# Patient Record
Sex: MALE | Race: WHITE | NOT HISPANIC OR LATINO | URBAN - METROPOLITAN AREA
[De-identification: names, ages, dates, MRNs, and addresses within clinical notes are randomized per-mention and may not be internally consistent; named-entity substitution may affect disease eponyms.]

---

## 2024-05-01 ENCOUNTER — TELEPHONE (OUTPATIENT)
Dept: NEUROSURGERY | Facility: CLINIC | Age: 34
End: 2024-05-01

## 2024-05-01 NOTE — TELEPHONE ENCOUNTER
Returned call. Patient will have MRI completed and will call once uploaded for appointment scheduling

## 2024-05-01 NOTE — TELEPHONE ENCOUNTER
----- Message from Carlota Majano sent at 5/1/2024  9:05 AM CDT -----  Regarding: appt access  Type:  Sooner Appointment Request    Caller is requesting a sooner appointment.  Caller declined first available appointment listed below.  Caller will not accept being placed on the waitlist and is requesting a message be sent to doctor.    Name of Caller:  pt   When is the first available appointment?  unk  Symptoms:  low back pain w/ radiation sciatic nerve  Best Call Back Number:  [phone] (home)    Additional Information:  requesting a appt and call back asap please advise thank you

## 2024-11-14 ENCOUNTER — OFFICE VISIT (OUTPATIENT)
Dept: NEUROSURGERY | Facility: CLINIC | Age: 34
End: 2024-11-14
Payer: OTHER GOVERNMENT

## 2024-11-14 VITALS
SYSTOLIC BLOOD PRESSURE: 144 MMHG | WEIGHT: 155 LBS | HEIGHT: 68 IN | HEART RATE: 114 BPM | DIASTOLIC BLOOD PRESSURE: 82 MMHG | RESPIRATION RATE: 18 BRPM | BODY MASS INDEX: 23.49 KG/M2

## 2024-11-14 DIAGNOSIS — M79.18 PIRIFORMIS MUSCLE PAIN: ICD-10-CM

## 2024-11-14 DIAGNOSIS — M46.1 SI (SACROILIAC) JOINT INFLAMMATION: Primary | ICD-10-CM

## 2024-11-14 PROCEDURE — 99203 OFFICE O/P NEW LOW 30 MIN: CPT | Mod: S$PBB,,, | Performed by: STUDENT IN AN ORGANIZED HEALTH CARE EDUCATION/TRAINING PROGRAM

## 2024-11-14 RX ORDER — SERTRALINE HYDROCHLORIDE 100 MG/1
TABLET, FILM COATED ORAL
COMMUNITY
Start: 2022-09-01

## 2024-11-14 RX ORDER — ZOLPIDEM TARTRATE 10 MG/1
TABLET ORAL
COMMUNITY

## 2024-11-14 RX ORDER — METHYLPREDNISOLONE 4 MG/1
TABLET ORAL
Qty: 1 EACH | Refills: 0 | Status: SHIPPED | OUTPATIENT
Start: 2024-11-14 | End: 2024-12-05

## 2024-11-14 RX ORDER — TAMSULOSIN HYDROCHLORIDE 0.4 MG/1
CAPSULE ORAL
COMMUNITY
Start: 2024-05-29

## 2024-11-14 RX ORDER — METHOCARBAMOL 500 MG/1
TABLET, FILM COATED ORAL
COMMUNITY
Start: 2024-04-12

## 2024-11-14 RX ORDER — LISINOPRIL 10 MG/1
TABLET ORAL
COMMUNITY
Start: 2024-02-14

## 2024-11-14 RX ORDER — NAPROXEN 500 MG/1
500 TABLET ORAL 2 TIMES DAILY
COMMUNITY
Start: 2024-10-02

## 2024-11-14 RX ORDER — LORATADINE 10 MG/1
TABLET ORAL
COMMUNITY
Start: 2024-02-14

## 2024-11-14 RX ORDER — METHYLPREDNISOLONE ACETATE 40 MG/ML
40 INJECTION, SUSPENSION INTRA-ARTICULAR; INTRALESIONAL; INTRAMUSCULAR; SOFT TISSUE
Status: SHIPPED | OUTPATIENT
Start: 2024-11-14

## 2024-11-14 NOTE — PROGRESS NOTES
[facility]  Clinic Consult     Consult Requested By: Krish Marinelli  PCP: Reyes Urias MD    SUBJECTIVE:     Chief Complaint:   Chief Complaint   Patient presents with    Lumbar Spine Pain (L-Spine)     Patient present to clinic with c/o right sided LBP with radiation into the buttock, hip and outer leg with a burning sensation.  Reports intermittent balance.        History of Present Illness:  Vin Michelle is a 34 y.o. male with HTN who presents for evaluation of leg pain. Patient reports pain in the right buttock with radiation into the right lateral thigh stopping at the knee. When the pain is severe, he will have some tingling in the lateral distal leg. Denies weakness. Denies bowel/bladder dysfunction. He has tried NSAIDs. No oral steroids. No PT. No injections.     Pertinent and recent history, provider evaluations, imaging and data reviewed in EPIC        History reviewed. No pertinent past medical history.  History reviewed. No pertinent surgical history.  No family history on file.      Review of patient's allergies indicates:   Allergen Reactions    Chicory Anaphylaxis and Swelling       Current Outpatient Medications:     lisinopriL 10 MG tablet, , Disp: , Rfl:     loratadine (CLARITIN) 10 mg tablet, , Disp: , Rfl:     methocarbamoL (ROBAXIN) 500 MG Tab, , Disp: , Rfl:     naproxen (NAPROSYN) 500 MG tablet, Take 500 mg by mouth 2 (two) times daily., Disp: , Rfl:     sertraline (ZOLOFT) 100 MG tablet, , Disp: , Rfl:     tamsulosin (FLOMAX) 0.4 mg Cap, , Disp: , Rfl:     zolpidem (AMBIEN) 10 mg Tab, , Disp: , Rfl:     Review of Systems:   Constitutional: no fever, chills or night sweats. No changes in weight   Eyes: no visual changes   ENT: no nasal congestion or sore throat   Respiratory: no cough or shortness of breath   Cardiovascular: no chest pain or palpitations   Gastrointestinal: no nausea or vomiting   Genitourinary: no hematuria or dysuria  "  Integument/Breast: no rash or pruritis   Hematologic/Lymphatic: no easy bruising or lymphadenopathy   Musculoskeletal: +leg pain   Neurological: no seizures or tremors   Behavioral/Psych: no auditory or visual hallucinations   Endocrine: no heat or cold intolerance         OBJECTIVE:     Vital Signs (Most Recent):  Pulse: (!) 114 (11/14/24 1431)  Resp: 18 (11/14/24 1431)  BP: (!) 144/82 (11/14/24 1431)  Estimated body mass index is 23.57 kg/m² as calculated from the following:    Height as of this encounter: 5' 8" (1.727 m).    Weight as of this encounter: 70.3 kg (155 lb).    Physical Exam:   General: well developed, well nourished, no distress   Neurologic: Alert and oriented. Thought content appropriate. GCS 15.   Head: normocephalic, atraumatic  Eyes: EOMI  Neck: trachea midline, no JVD   Cardiovascular: no LE edema  Pulmonary: normal respirations, no signs of respiratory distress  Abdomen: non-distended  Sensory: intact to light touch throughout  Skin: Skin is warm, dry and intact    Motor Strength: Moves all extremities spontaneously with good tone. No abnormal movements seen.       Iliopsoas Quadriceps Knee  Flexion Tibialis  anterior Gastro- cnemius EHL   Lower: R 5/5 5/5 5/5 5/5 5/5 5/5    L 5/5 5/5 5/5 5/5 5/5 5/5     DTR's: 2 + LE   Gait: normal    Very positive tender to palpation in the right SI joint  Positive provocative maneuvers    Diagnostic Results:  I have independently reviewed the following imaging:  No central or foraminal stenosis      ASSESSMENT/PLAN:     SI (sacroiliac) joint inflammation    Piriformis muscle pain    Other orders  -     methylPREDNISolone (MEDROL DOSEPACK) 4 mg tablet; use as directed  Dispense: 1 each; Refill: 0  -     methylPREDNISolone acetate injection 40 mg        Vin Michelle is a 34 y.o. male    Chief complaint is right-sided lower back pain.  With intermittent radiation into the buttock or anterior hip  I his MRIs without neural compression or " concerns  Clinically I think he is highly symptomatic in the gluteal region and right SI joint  I he has been referred to physical therapy  Should he have persistent would recommend follow up with pain management        Patient verbalized understanding of plan. Encouraged to call with any questions or concerns.     This note was partially dictated using voice recognition software, so please excuse any errors that were not corrected.